# Patient Record
Sex: MALE | Race: OTHER | Employment: UNEMPLOYED | URBAN - NONMETROPOLITAN AREA
[De-identification: names, ages, dates, MRNs, and addresses within clinical notes are randomized per-mention and may not be internally consistent; named-entity substitution may affect disease eponyms.]

---

## 2018-09-14 ENCOUNTER — HOSPITAL ENCOUNTER (EMERGENCY)
Age: 18
Discharge: HOME | End: 2018-09-14
Attending: EMERGENCY MEDICINE

## 2018-09-14 ENCOUNTER — APPOINTMENT (OUTPATIENT)
Dept: CT IMAGING | Age: 18
End: 2018-09-14

## 2018-09-14 VITALS
OXYGEN SATURATION: 100 % | DIASTOLIC BLOOD PRESSURE: 95 MMHG | HEART RATE: 61 BPM | RESPIRATION RATE: 14 BRPM | SYSTOLIC BLOOD PRESSURE: 138 MMHG | TEMPERATURE: 98.6 F

## 2018-09-14 DIAGNOSIS — Z13.9 ENCOUNTER FOR MEDICAL SCREENING EXAMINATION: Primary | ICD-10-CM

## 2018-09-14 PROCEDURE — 99283 EMERGENCY DEPT VISIT LOW MDM: CPT

## 2018-09-14 PROCEDURE — 74176 CT ABD & PELVIS W/O CONTRAST: CPT

## 2018-09-14 NOTE — ED NOTES
Writer used  to inform patient of CT scan. He denies questions at this time.       Miquel Putnam RN  09/14/18 [id number]

## 2018-09-14 NOTE — ED PROVIDER NOTES
oriented x 3, Normal motor function, Normal sensory function, No focal deficits noted. Psychiatric:  Affect normal, Mood normal.     SCREENINGS          EKG        PROCEDURES    Procedures    RADIOLOGY    CT ABDOMEN PELVIS WO CONTRAST Additional Contrast? None   Final Result   Unremarkable CT examination of the abdomen and pelvis. No acute finding. No evidence of a foreign body in the abdomen or pelvis. Labs  Labs Reviewed - No data to display          Summation      Patient Course: Patient remained stable without any complaints in the ER. ED Medications administered this visit:  Medications - No data to display    New Prescriptions from this visit:    New Prescriptions    No medications on file       Follow-up:  [place] [id number]  [phone]  In 1 week    Disposition- patient is being discharged to detention      Final Impression:   1.  Encounter for medical screening examination               (Please note that portions of this note were completed with a voice recognition program.  Efforts were made to edit the dictations but occasionally words are mis-transcribed.)          Lori Lerner MD  09/14/18 [id number]